# Patient Record
Sex: FEMALE | Race: WHITE
[De-identification: names, ages, dates, MRNs, and addresses within clinical notes are randomized per-mention and may not be internally consistent; named-entity substitution may affect disease eponyms.]

---

## 2019-03-05 ENCOUNTER — HOSPITAL ENCOUNTER (EMERGENCY)
Dept: HOSPITAL 10 - FTE | Age: 24
Discharge: HOME | End: 2019-03-05
Payer: SELF-PAY

## 2019-03-05 ENCOUNTER — HOSPITAL ENCOUNTER (EMERGENCY)
Dept: HOSPITAL 91 - FTE | Age: 24
Discharge: HOME | End: 2019-03-05
Payer: SELF-PAY

## 2019-03-05 VITALS — DIASTOLIC BLOOD PRESSURE: 79 MMHG | SYSTOLIC BLOOD PRESSURE: 125 MMHG | RESPIRATION RATE: 18 BRPM | HEART RATE: 84 BPM

## 2019-03-05 VITALS
WEIGHT: 180.78 LBS | BODY MASS INDEX: 35.49 KG/M2 | BODY MASS INDEX: 35.49 KG/M2 | HEIGHT: 60 IN | HEIGHT: 60 IN | WEIGHT: 180.78 LBS

## 2019-03-05 DIAGNOSIS — R10.2: ICD-10-CM

## 2019-03-05 DIAGNOSIS — N93.9: Primary | ICD-10-CM

## 2019-03-05 LAB
ADD MAN DIFF?: NO
ADD UMIC: YES
ALANINE AMINOTRANSFERASE: 36 IU/L (ref 13–69)
ALBUMIN/GLOBULIN RATIO: 1.21
ALBUMIN: 4.5 G/DL (ref 3.3–4.9)
ALKALINE PHOSPHATASE: 82 IU/L (ref 42–121)
ANION GAP: 7 (ref 5–13)
ASPARTATE AMINO TRANSFERASE: 34 IU/L (ref 15–46)
BASOPHIL #: 0 10^3/UL (ref 0–0.1)
BASOPHILS %: 0.4 % (ref 0–2)
BILIRUBIN,DIRECT: 0 MG/DL (ref 0–0.2)
BILIRUBIN,TOTAL: 0.1 MG/DL (ref 0.2–1.3)
BLOOD UREA NITROGEN: 12 MG/DL (ref 7–20)
CALCIUM: 9.9 MG/DL (ref 8.4–10.2)
CARBON DIOXIDE: 29 MMOL/L (ref 21–31)
CHLORIDE: 106 MMOL/L (ref 97–110)
CREATININE: 0.5 MG/DL (ref 0.44–1)
EOSINOPHILS #: 0.1 10^3/UL (ref 0–0.5)
EOSINOPHILS %: 0.9 % (ref 0–7)
GLOBULIN: 3.7 G/DL (ref 1.3–3.2)
GLUCOSE: 107 MG/DL (ref 70–220)
HEMATOCRIT: 40.7 % (ref 37–47)
HEMOGLOBIN: 12.8 G/DL (ref 12–16)
IMMATURE GRANS #M: 0.02 10^3/UL (ref 0–0.03)
IMMATURE GRANS % (M): 0.3 % (ref 0–0.43)
INR: 0.97
LYMPHOCYTES #: 2.7 10^3/UL (ref 0.8–2.9)
LYMPHOCYTES %: 34.9 % (ref 15–51)
MEAN CORPUSCULAR HEMOGLOBIN: 24.4 PG (ref 29–33)
MEAN CORPUSCULAR HGB CONC: 31.4 G/DL (ref 32–37)
MEAN CORPUSCULAR VOLUME: 77.7 FL (ref 82–101)
MEAN PLATELET VOLUME: 9.7 FL (ref 7.4–10.4)
MONOCYTE #: 0.4 10^3/UL (ref 0.3–0.9)
MONOCYTES %: 4.6 % (ref 0–11)
NEUTROPHIL #: 4.6 10^3/UL (ref 1.6–7.5)
NEUTROPHILS %: 58.9 % (ref 39–77)
NUCLEATED RED BLOOD CELLS #: 0 10^3/UL (ref 0–0)
NUCLEATED RED BLOOD CELLS%: 0 /100WBC (ref 0–0)
PARTIAL THROMBOPLASTIN TIME: 34.9 SEC (ref 23–35)
PLATELET COUNT: 471 10^3/UL (ref 140–415)
POTASSIUM: 4 MMOL/L (ref 3.5–5.1)
PROTIME: 13 SEC (ref 11.9–14.9)
PT RATIO: 1
RED BLOOD COUNT: 5.24 10^6/UL (ref 4.2–5.4)
RED CELL DISTRIBUTION WIDTH: 13.5 % (ref 11.5–14.5)
SODIUM: 142 MMOL/L (ref 135–144)
TOTAL PROTEIN: 8.2 G/DL (ref 6.1–8.1)
UR ASCORBIC ACID: NEGATIVE MG/DL
UR BILIRUBIN (DIP): NEGATIVE MG/DL
UR BLOOD (DIP): (no result) MG/DL
UR CLARITY: CLEAR
UR COLOR: YELLOW
UR GLUCOSE (DIP): NEGATIVE MG/DL
UR KETONES (DIP): NEGATIVE MG/DL
UR LEUKOCYTE ESTERASE (DIP): NEGATIVE LEU/UL
UR NITRITE (DIP): NEGATIVE MG/DL
UR PH (DIP): 7 (ref 5–9)
UR RBC: > 182 /HPF (ref 0–5)
UR SPECIFIC GRAVITY (DIP): 1.03 (ref 1–1.03)
UR SQUAMOUS EPITHELIAL CELL: (no result) /HPF
UR TOTAL PROTEIN (DIP): NEGATIVE MG/DL
UR UROBILINOGEN (DIP): NEGATIVE MG/DL
UR WBC: 3 /HPF (ref 0–5)
WHITE BLOOD COUNT: 7.8 10^3/UL (ref 4.8–10.8)

## 2019-03-05 PROCEDURE — 80053 COMPREHEN METABOLIC PANEL: CPT

## 2019-03-05 PROCEDURE — 86901 BLOOD TYPING SEROLOGIC RH(D): CPT

## 2019-03-05 PROCEDURE — 81025 URINE PREGNANCY TEST: CPT

## 2019-03-05 PROCEDURE — 36415 COLL VENOUS BLD VENIPUNCTURE: CPT

## 2019-03-05 PROCEDURE — 85610 PROTHROMBIN TIME: CPT

## 2019-03-05 PROCEDURE — 76856 US EXAM PELVIC COMPLETE: CPT

## 2019-03-05 PROCEDURE — 85025 COMPLETE CBC W/AUTO DIFF WBC: CPT

## 2019-03-05 PROCEDURE — 81001 URINALYSIS AUTO W/SCOPE: CPT

## 2019-03-05 PROCEDURE — 86900 BLOOD TYPING SEROLOGIC ABO: CPT

## 2019-03-05 PROCEDURE — 85730 THROMBOPLASTIN TIME PARTIAL: CPT

## 2019-03-05 PROCEDURE — 99284 EMERGENCY DEPT VISIT MOD MDM: CPT

## 2019-03-05 RX ADMIN — ACETAMINOPHEN 1 MG: 325 TABLET, FILM COATED ORAL at 12:42

## 2019-03-05 NOTE — ERD
ER Documentation


Chief Complaint


Chief Complaint





VAG BLEED , ONSET YESTERDAY





HPI


This is a 23-year-old female  who presents to ED with complaints of vaginal 


bleeding times 2 days and passed a clot.  Last menstrual period 3-3-19.  Patient


admits to some nausea and lower pelvic discomfort associated with the vaginal 


bleeding.  Admits to history of irregular menses and states that she will often 


go for multiple months without having a period.  Patient is sexually active with


a male and is attempting to get pregnant.  Patient has not taken a pregnancy 


test.  Denies vomiting, fever, chills, hemoptysis, hematemesis, diarrhea, c


onstipation, melena, hematochezia, vaginal pain, vaginal discharge, dysuria, and


all the symptoms.





ROS


All systems reviewed and are negative except as per history of present illness.





Allergies


Allergies:  


Coded Allergies:  


     No Known Allergy (Unverified , 5/7/15)





PMhx/Soc


Hx Alcohol Use:  No


Hx Substance Use:  No


Hx Tobacco Use:  No


Smoking Status:  Never smoker





FmHx


Family History:  No diabetes





Physical Exam


Vitals





Vital Signs


  Date      Temp  Pulse  Resp  B/P (MAP)   Pulse Ox  O2          O2 Flow    FiO2


Time                                                 Delivery    Rate


    3/5/19  98.1     84    18      125/79        99


     10:52                           (94)





Physical Exam


Physical Exam


Vitals signs: Reviewed by me.


General: Well developed, well nourished, in no acute distress. Patient is awake 


and alert.


Head: Normocephalic, atraumatic.


Eyes: Normal conjunctiva, Pupils PERRLA, EOM intact grossly


ENT: Pharynx is clear, Moist mucous membranes, external ears, nose and mouth 


normal


Neck: Supple, no masses, lymphadenopathy or JVD


Respiratory: Clear to auscultation bilaterally with no wheezing, rhonchi, rales,


no distress


Cardiovascular: RRR, no murmurs, rubs, or gallops


Abdominal: Soft, nondistended, no peritoneal signs, no rigidity, no surgical 


abdomen, bowel sounds present all 4 quadrants, nontender light deep palpation 


all 4 quadrants, McBurney's point nontender, no rebound tenderness, no 


suprapubic tenderness


: Deferred


MSK: No edema, no unilateral swelling, 5/5 strength


Back: No midline tenderness. No flank tenderness


Neurologic: Alert and oriented, moving all extremities, normal speech, no focal 


weakness, no cerebellar signs. Normal mentation


Skin: warm and dry, No rash


Psych: Normal mood


Result Diagram:  


3/5/19 1235                                                                     


          3/5/19 1235





Results 24 hrs





Laboratory Tests


Test
                                3/5/19
12:35     3/5/19
12:40  3/5/19
12:50


White Blood Count                    7.8 10^3/ul


Red Blood Count                     5.24 10^6/ul


Hemoglobin                             12.8 g/dl


Hematocrit                                40.7 %


Mean Corpuscular Volume                  77.7 fl


Mean Corpuscular Hemoglobin              24.4 pg


Mean Corpuscular                      31.4 g/dl 
  
                



Hemoglobin
Concent


Red Cell Distribution Width               13.5 %


Platelet Count                       471 10^3/UL


Mean Platelet Volume                      9.7 fl


Immature Granulocytes %                  0.300 %


Neutrophils %                             58.9 %


Lymphocytes %                             34.9 %


Monocytes %                                4.6 %


Eosinophils %                              0.9 %


Basophils %                                0.4 %


Nucleated Red Blood Cells %          0.0 /100WBC


Immature Granulocytes #            0.020 10^3/ul


Neutrophils #                        4.6 10^3/ul


Lymphocytes #                        2.7 10^3/ul


Monocytes #                          0.4 10^3/ul


Eosinophils #                        0.1 10^3/ul


Basophils #                          0.0 10^3/ul


Nucleated Red Blood Cells #          0.0 10^3/ul


Prothrombin Time                        13.0 Sec


Prothrombin Time Ratio                       1.0


INR International                          0.97 
  
                



Normalized
Ratio


Activated Partial
Thromboplast         34.9 Sec 
  
                



Time


Sodium Level                          142 mmol/L


Potassium Level                       4.0 mmol/L


Chloride Level                        106 mmol/L


Carbon Dioxide Level                   29 mmol/L


Anion Gap                                      7


Blood Urea Nitrogen                     12 mg/dl


Creatinine                            0.50 mg/dl


Est Glomerular Filtrat             > 60 mL/min 
   
                



Rate
mL/min


Glucose Level                          107 mg/dl


Calcium Level                          9.9 mg/dl


Total Bilirubin                        0.1 mg/dl


Direct Bilirubin                      0.00 mg/dl


Indirect Bilirubin                     0.1 mg/dl


Aspartate Amino Transf
(AST/SGOT)       34 IU/L 
  
                



Alanine                                 36 IU/L 
  
                



Aminotransferase
(ALT/SGPT)


Alkaline Phosphatase                     82 IU/L


Total Protein                           8.2 g/dl


Albumin                                 4.5 g/dl


Globulin                               3.70 g/dl


Albumin/Globulin Ratio                      1.21


Urine Color                                        YELLOW


Urine Clarity                                      CLEAR


Urine pH                                                      7.0


Urine Specific Gravity                                      1.027


Urine Ketones                                      NEGATIVE mg/dL


Urine Nitrite                                      NEGATIVE mg/dL


Urine Bilirubin                                    NEGATIVE mg/dL


Urine Urobilinogen                                 NEGATIVE mg/dL


Urine Leukocyte Esterase
          
               NEGATIVE
Roverto/ul  



Urine Microscopic RBC                              > 182 /HPF


Urine Microscopic WBC                                      3 /HPF


Urine Squamous Epithelial
Cells    
               FEW /HPF 
       



Urine Hemoglobin                                         3+ mg/dL


Urine Glucose                                      NEGATIVE mg/dL


Urine Total Protein                                NEGATIVE mg/dl


POC Beta HCG, Qualitative                                           NEGATIVE





Current Medications


 Medications
   Dose
          Sig/Prudence
       Start Time
   Status  Last


 (Trade)       Ordered        Route
 PRN     Stop Time              Admin
Dose


                              Reason                                Admin


                650 mg         ONCE  STAT
    3/5/19        DC            3/5/19


Acetaminophen                 PO
            12:20
 3/5/19                12:42




  (Tylenol                                  12:23


Tab)








Procedures/MDM


EKG, MONITORS, & DIAGNOSTIC IMAGING:


                          [place] [address]


                        Radiology Main Line: [phone]





                            DIAGNOSTIC IMAGING REPORT





Patient: SANNA HARGROVE   : 1995   Age: 23  Sex: F                       





       MR #:    [id number]   Acct #:   [id number]    DOS: 19 1220


Ordering MD: JEANETTE WONG PA-C   Location:  [facility]   Room/Bed:               


                            


                                        


PROCEDURE:   US Pelvis. 


 


CLINICAL INDICATION:   pelvic pain , vaginal bleeding


 


TECHNIQUE:   Multiple sonographic images of the pelvis were obtained utilizing 


transabdominal  technique.   The images were reviewed on a PACS workstation. 


 


COMPARISON:   None. 


 


FINDINGS:


 


The uterus is normal in size with a normal appearance of the myometrium.  The 


uterus measures 6.4 x 2.9 x 3.3 cm. 


The endometrial stripe is homogeneous in appearance and has the thickness of 8 


mm.


 


The ovaries are normal in size and echogenicity. Normal Doppler flow is iden


tified in both ovaries.


The right ovary measures 3.5 x 1.9 x 2.4 cm.


The left ovary measures 3.6 x 2.1 x 2.2 cm.


 


No free fluid is present within the pelvis.


 


RPTAT: AA


 


IMPRESSION:


 


Unremarkable pelvic ultrasound.


_____________________________________________ 


.Moy Diop MD, MD           Date    Time 


Electronically viewed and signed by .Moy Diop MD, MD on 2019 


13:51 


 


D:  2019 13:51  T:  2019 13:51


.S/





CC: JEANETTE WONG PA-C





[id number]





LAB INTERPRETATION:


CBC shows no evidence of hemorrhage or infection


Chemistry shows no evidence of significant electrolyte abnormalities or renal 


insufficiency


Liver function test shows no evidence of acute biliary or hepatic dysfunction


Urine pregnancy negative


Urinalysis remarkable for greater than 182 microscopic RBCs, no leukocyte 


esterase and no nitrite


Blood type AB +








ER COURSE:


The patient was given tylenol


The medication was well tolerated and the patient reports improvement in 


symptoms.  


The patient was stable throughout ED course.


I kept the patient and/or family informed of laboratory and diagnostic imaging 


results throughout the emergency room course.





The patient was promptly evaluated and a treatment plan was devised based on H&P


and other data. This plan was discussed with the patient who agreed and had no 


further questions or concerns prior to discharge.





MEDICAL DECISION MAKING:


This is a 23-year-old female presents ED with vaginal bleeding passing clots 


since 3-3-19.  Differential diagnosis includes but is not limited to menses, 


spontaneous , missed , ectopic pregnancy, tubo-ovarian abscess, 


fibroids, UTI, among others.  Patient is trying to get pregnant. Urine pregnancy


negative.  Blood work in the emergency department is also unremarkable.  


Ultrasound is unremarkable.  This is likely patient's menses, dysfunctional 


uterine bleeding, or menorrhagia.  Patient was advised to follow-up with 


gynecologist in net 48 hours.  At this time there is no gynecologic/GI/ 


emergency.  No evidence of tubo-ovarian abscess, ectopic pregnancy, sepsis, 


hemorrhage, ovarian torsion, appendicitis, small bowel obstruction, perforated 


viscus, cholecystitis, obstructive pyelonephritis, among others.  Vitals are 


stable patient can be managed close outpatient follow-up.  Advised patient 


follow-up with primary care in the next 48 hours.  Return to ED with any 


worsening symptoms





DISPOSITION PLAN:


We discussed follow up with the patient's primary care doctor within 24 to 48 


hours.  Patient counseled regarding my diagnostic impression and care plan. 


Prior to discharge all questions answered. Pt agrees with treatment plan and 


understands strict return precautions. Precautionary instructions provided 


including instructions to return to the ER if not improving or for any worsening


or changing symptoms or concerns.





SPECIALIST FOLLOW UP RECOMMENDED: obgyn


Patient has been advised to follow up with primary care in 1-2 days.





Disclaimer: Inadvertent spelling and grammatical errors are likely due to 


EHR/dictation software use and do not reflect on the overall quality of patient 


care. Also, please note that the electronic time recorded on this note does not 


necessarily reflect the actual time of the patient encounter.





Departure


Diagnosis:  


   Primary Impression:  


   Vaginal bleeding


Condition:  Stable


Patient Instructions:  Dysmenorrhea, Hormones Control Your Menstrual Cycle, 


Menorrhagia, Understanding Periods, Understanding the Normal Menstrual Cycle


Referrals:  


[facility]





OB/GYN REFERRAL LIST





Additional Instructions:  


Patient advised to return to the ED immediately for new or worsening symptoms. 


Patient advised to follow up with primary care provider in the next 24-48 hours.


Patient verbalized understanding and agrees with treatment plan and course of 


action.





If patient has no primary care they may follow up with one of the community 


clinics listed on the following page or one of the options listed below








[facility]


[address] [place] [id number]





or





[facility]


[address] [place] [address]





or





[facility]


[address] [place] [address]











JEANETTE WONG PA-C           Mar 5, 2019 12:52

## 2021-04-18 ENCOUNTER — HOSPITAL ENCOUNTER (EMERGENCY)
Dept: HOSPITAL 15 - ER | Age: 26
Discharge: HOME | End: 2021-04-18
Payer: COMMERCIAL

## 2021-04-18 VITALS — BODY MASS INDEX: 34.16 KG/M2 | HEIGHT: 60 IN | WEIGHT: 174 LBS

## 2021-04-18 VITALS — DIASTOLIC BLOOD PRESSURE: 47 MMHG | SYSTOLIC BLOOD PRESSURE: 105 MMHG

## 2021-04-18 DIAGNOSIS — S83.92XA: Primary | ICD-10-CM
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
## 2021-04-18 DIAGNOSIS — F12.10: ICD-10-CM
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
## 2021-04-18 PROCEDURE — 96372 THER/PROPH/DIAG INJ SC/IM: CPT

## 2021-04-18 PROCEDURE — 73562 X-RAY EXAM OF KNEE 3: CPT

## 2021-04-18 PROCEDURE — 99283 EMERGENCY DEPT VISIT LOW MDM: CPT
